# Patient Record
Sex: MALE | Race: WHITE | NOT HISPANIC OR LATINO | Employment: UNEMPLOYED | URBAN - NONMETROPOLITAN AREA
[De-identification: names, ages, dates, MRNs, and addresses within clinical notes are randomized per-mention and may not be internally consistent; named-entity substitution may affect disease eponyms.]

---
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
## 2022-10-26 ENCOUNTER — HOSPITAL ENCOUNTER (EMERGENCY)
Facility: CLINIC | Age: 22
Discharge: HOME | End: 2022-10-26
Attending: PHYSICIAN ASSISTANT | Admitting: PHYSICIAN ASSISTANT
Payer: COMMERCIAL

## 2022-10-26 VITALS
TEMPERATURE: 97.2 F | OXYGEN SATURATION: 100 % | WEIGHT: 158 LBS | BODY MASS INDEX: 23.33 KG/M2 | SYSTOLIC BLOOD PRESSURE: 136 MMHG | RESPIRATION RATE: 16 BRPM | HEART RATE: 63 BPM | DIASTOLIC BLOOD PRESSURE: 64 MMHG

## 2022-10-26 DIAGNOSIS — M54.50 ACUTE LEFT-SIDED LOW BACK PAIN WITHOUT SCIATICA: ICD-10-CM

## 2022-10-26 PROCEDURE — G0463 HOSPITAL OUTPT CLINIC VISIT: HCPCS | Performed by: PHYSICIAN ASSISTANT

## 2022-10-26 PROCEDURE — 99213 OFFICE O/P EST LOW 20 MIN: CPT | Performed by: PHYSICIAN ASSISTANT

## 2022-10-26 RX ORDER — IBUPROFEN 600 MG/1
600 TABLET, FILM COATED ORAL EVERY 6 HOURS PRN
Qty: 28 TABLET | Refills: 0 | Status: SHIPPED | OUTPATIENT
Start: 2022-10-26 | End: 2022-11-02

## 2022-10-26 RX ORDER — LIDOCAINE 50 MG/G
1 PATCH TOPICAL EVERY 24 HOURS
Qty: 6 PATCH | Refills: 0 | Status: SHIPPED | OUTPATIENT
Start: 2022-10-26 | End: 2022-11-01

## 2022-10-26 RX ORDER — METHOCARBAMOL 750 MG/1
750 TABLET, FILM COATED ORAL 4 TIMES DAILY PRN
Qty: 16 TABLET | Refills: 0 | Status: SHIPPED | OUTPATIENT
Start: 2022-10-26 | End: 2022-10-30

## 2022-10-26 ASSESSMENT — ENCOUNTER SYMPTOMS
BACK PAIN: 1
NEUROLOGICAL NEGATIVE: 1
CONSTITUTIONAL NEGATIVE: 1
GASTROINTESTINAL NEGATIVE: 1

## 2022-10-26 NOTE — DISCHARGE INSTRUCTIONS
Recommend urgent medical evaluation if you develop worsening pain, worsening numbness or tingling or loss of feeling in the back or lower extremities, or saddle anesthesia (numbness or tingling in the groin area) with urinary or bowel incontinence or retention.  Apply ice to the affected area and 15 to 20-minute intervals 4 times per day for 48 hours post injury, may then consider applying heat in 15 to 20-minute intervals 4 times per day.  May use Voltaren gel 4 times per day or a lidocaine patches (apply to the low back for 12 hours per day) for additional pain control. Recommend not using heat at the same time as lidocaine patches or Voltaren gel to avoid excessive irritation.    Prescribed robaxin, which is a muscle relaxant.  Muscle relaxants will make you feel drowsy.  No working, driving, or operating equipment for 8 hours from your last dose of muscle relaxants.

## 2022-10-26 NOTE — ED PROVIDER NOTES
History     Chief Complaint   Patient presents with     Back Pain     Pt was lifting a dock at work and hurt his back.     HPI  Ayaz Bocanegra is a 21 year old male a nonconcerning past medical history who presents for evaluation of left-sided back pain which began yesterday.  States that he was lifting a dock at work, typically lifts docks all day, and suddenly developed pain on the left side in the lumbar and thoracic regions.  Pain is exacerbated with rotation at the hips, the pain causes him to feel short of breath.  However, he otherwise denies shortness of breath.  No saddle anesthesia, no urinary or bowel incontinence or retention.  No trauma.  No numbness or tingling or weakness in the extremities.    Allergies:  Allergies   Allergen Reactions     Amoxicillin        Problem List:    Patient Active Problem List    Diagnosis Date Noted     Current moderate episode of major depressive disorder without prior episode (H) 12/03/2020     Priority: Medium     ELIEL (generalized anxiety disorder) 12/03/2020     Priority: Medium     Laceration 01/23/2017     Priority: Medium     Headache 07/26/2013     Priority: Medium     Problem list name updated by automated process. Provider to review       Otitis media      Priority: Medium     recurrent  Problem list name updated by automated process. Provider to review          Past Medical History:    History reviewed. No pertinent past medical history.    Past Surgical History:    History reviewed. No pertinent surgical history.    Family History:    Family History   Problem Relation Age of Onset     Anxiety Disorder Mother      Anxiety Disorder Father        Social History:  Marital Status:  Single [1]  Social History     Tobacco Use     Smoking status: Never     Smokeless tobacco: Never   Substance Use Topics     Alcohol use: Never     Drug use: Never        Medications:    ibuprofen (ADVIL/MOTRIN) 600 MG tablet  lidocaine (LIDODERM) 5 % patch  methocarbamol (ROBAXIN)  750 MG tablet  FLUoxetine (PROZAC) 20 MG capsule          Review of Systems   Constitutional: Negative.    Gastrointestinal: Negative.    Genitourinary: Negative.    Musculoskeletal: Positive for back pain.   Neurological: Negative.        Physical Exam   BP: 136/64  Pulse: 63  Temp: 97.2  F (36.2  C)  Resp: 16  Weight: 71.7 kg (158 lb)  SpO2: 100 %      Physical Exam  Constitutional:       General: He is not in acute distress.     Appearance: Normal appearance. He is not ill-appearing, toxic-appearing or diaphoretic.   Pulmonary:      Effort: Pulmonary effort is normal. No respiratory distress.      Breath sounds: No stridor. No wheezing, rhonchi or rales.   Chest:      Chest wall: No tenderness.   Musculoskeletal:         General: No swelling.      Cervical back: Normal.      Thoracic back: Spasms and tenderness present. No swelling, edema, deformity, signs of trauma, lacerations or bony tenderness. Normal range of motion.      Lumbar back: Spasms and tenderness present. No swelling, edema, deformity, signs of trauma, lacerations or bony tenderness. Normal range of motion. Negative right straight leg raise test and negative left straight leg raise test. No scoliosis.        Back:       Comments: Spasms and tenderness over the paraspinal muscles at the level of T11-L5 on the left side at the location shown on the diagram above.   Skin:     General: Skin is warm and dry.      Findings: No erythema or rash.   Neurological:      General: No focal deficit present.      Mental Status: He is alert and oriented to person, place, and time.      Sensory: No sensory deficit.      Motor: No weakness.      Gait: Gait normal.      Deep Tendon Reflexes: Reflexes normal.         ED Course                 Procedures                  No results found for this or any previous visit (from the past 24 hour(s)).    Medications - No data to display    Assessments & Plan (with Medical Decision Making)     The patient is a 21-year-old  male who presents to clinic for left-sided low back pain brought on by heavy lifting.  He has no symptoms consistent with saddle anesthesia, conus medullaris, or cauda equina syndrome today.  Normal neuromuscular function in the lower extremities.  No concerns with bowel or bladder function.      Recommend urgent medical evaluation if you develop worsening pain, worsening numbness or tingling or loss of feeling in the back or lower extremities, or saddle anesthesia (numbness or tingling in the groin area) with urinary or bowel incontinence or retention.  Apply ice to the affected area and 15 to 20-minute intervals 4 times per day for 48 hours post injury, may then consider applying heat in 15 to 20-minute intervals 4 times per day.  May use Voltaren gel 4 times per day or a lidocaine patches (apply to the low back for 12 hours per day) for additional pain control. Recommend not using heat at the same time as lidocaine patches or Voltaren gel to avoid excessive irritation.    Prescribed robaxin, which is a muscle relaxant.  Muscle relaxants will make you feel drowsy.  No working, driving, or operating equipment for 8 hours from your last dose of muscle relaxants.        I have reviewed the nursing notes.    I have reviewed the findings, diagnosis, plan and need for follow up with the patient.      Discharge Medication List as of 10/26/2022  2:17 PM      START taking these medications    Details   ibuprofen (ADVIL/MOTRIN) 600 MG tablet Take 1 tablet (600 mg) by mouth every 6 hours as needed for moderate pain, Disp-28 tablet, R-0, E-Prescribe      lidocaine (LIDODERM) 5 % patch Place 1 patch onto the skin every 24 hours for 6 days To prevent lidocaine toxicity, patient should be patch free for 12 hrs daily.Disp-6 patch, [id number]      methocarbamol (ROBAXIN) 750 MG tablet Take 1 tablet (750 mg) by mouth 4 times daily as needed for muscle spasms, Disp-16 tablet, R-0, E-Prescribe             Final diagnoses:   Acute  left-sided low back pain without sciatica       10/26/2022   [facility] EMERGENCY DEPT     Jamie Rao PA-C  10/26/22 2111

## 2023-01-03 ENCOUNTER — OFFICE VISIT (OUTPATIENT)
Dept: FAMILY MEDICINE | Facility: CLINIC | Age: 23
End: 2023-01-03
Payer: COMMERCIAL

## 2023-01-03 VITALS
WEIGHT: 166 LBS | BODY MASS INDEX: 24.51 KG/M2 | RESPIRATION RATE: 18 BRPM | DIASTOLIC BLOOD PRESSURE: 80 MMHG | HEART RATE: 104 BPM | SYSTOLIC BLOOD PRESSURE: 134 MMHG | TEMPERATURE: 98.3 F

## 2023-01-03 DIAGNOSIS — J02.0 STREPTOCOCCAL PHARYNGITIS: Primary | ICD-10-CM

## 2023-01-03 DIAGNOSIS — J02.9 SORE THROAT: ICD-10-CM

## 2023-01-03 LAB — DEPRECATED S PYO AG THROAT QL EIA: POSITIVE

## 2023-01-03 PROCEDURE — 99213 OFFICE O/P EST LOW 20 MIN: CPT | Performed by: NURSE PRACTITIONER

## 2023-01-03 PROCEDURE — 87880 STREP A ASSAY W/OPTIC: CPT | Performed by: NURSE PRACTITIONER

## 2023-01-03 RX ORDER — AZITHROMYCIN 250 MG/1
TABLET, FILM COATED ORAL
Qty: 6 TABLET | Refills: 0 | Status: SHIPPED | OUTPATIENT
Start: 2023-01-03 | End: 2023-01-08

## 2023-01-03 ASSESSMENT — PATIENT HEALTH QUESTIONNAIRE - PHQ9
10. IF YOU CHECKED OFF ANY PROBLEMS, HOW DIFFICULT HAVE THESE PROBLEMS MADE IT FOR YOU TO DO YOUR WORK, TAKE CARE OF THINGS AT HOME, OR GET ALONG WITH OTHER PEOPLE: NOT DIFFICULT AT ALL
SUM OF ALL RESPONSES TO PHQ QUESTIONS 1-9: 0
SUM OF ALL RESPONSES TO PHQ QUESTIONS 1-9: 0

## 2023-01-03 NOTE — PROGRESS NOTES
"  Assessment & Plan     Streptococcal pharyngitis  Allergy to PCN  Begin  - azithromycin (ZITHROMAX) 250 MG tablet  Dispense: 6 tablet; Refill: 0    Sore throat  Symptomatic care strategies reviewed.   - Streptococcus A Rapid Screen w/Reflex to PCR - Clinic Collect      Call or return to the clinic with any worsening of symptoms or no resolution. Patient/Parent verbalized understanding and is in agreement. Medication side effects reviewed.   Current Outpatient Medications   Medication Sig Dispense Refill     azithromycin (ZITHROMAX) 250 MG tablet Take 2 tablets (500 mg) by mouth daily for 1 day, THEN 1 tablet (250 mg) daily for 4 days. 6 tablet 0     FLUoxetine (PROZAC) 20 MG capsule Take 1 capsule (20 mg) by mouth daily 90 capsule 3     Chart documentation with Dragon Voice recognition Software. Although reviewed after completion, some words and grammatical errors may remain.    JAYESH Loving CNP  [facility]    Sanjana Jacome is a 22 year old, presenting for the following health issues:  Pharyngitis      History of Present Illness       Headaches:   Since the patient's last clinic visit, headaches are: no change  The patient is getting headaches:  Not to often  He is able to do normal daily activities when he has a migraine.  The patient is taking the following rescue/relief medications:  Ibuprofen (Advil, Motrin) and Tylenol   Patient states \"I get some relief\" from the rescue/relief medications.   The patient is taking the following medications to prevent migraines:  No medications to prevent migraines  In the past 4 weeks, the patient has gone to an Urgent Care or Emergency Room 0 times times due to headaches.    Reason for visit:  Test for strep  Symptom onset:  1-3 days ago  Symptoms include:  Headache, soar throat wake up sweating hot and cold  Symptom intensity:  Moderate  Symptom progression:  Staying the same  Had these symptoms before:  No  What makes it " worse:  No    He eats 0-1 servings of fruits and vegetables daily.He consumes 1 sweetened beverage(s) daily.He exercises with enough effort to increase his heart rate 20 to 29 minutes per day.  He exercises with enough effort to increase his heart rate 7 days per week.   He is taking medications regularly.    Today's PHQ-9         PHQ-9 Total Score: 0    PHQ-9 Q9 Thoughts of better off dead/self-harm past 2 weeks :   Not at all    How difficult have these problems made it for you to do your work, take care of things at home, or get along with other people: Not difficult at all     Negative Covid this morning           Review of Systems   Constitutional, HEENT, cardiovascular, pulmonary, GI, , musculoskeletal, neuro, skin, endocrine and psych systems are negative, except as otherwise noted.      Objective    /80   Pulse 104   Temp 98.3  F (36.8  C) (Tympanic)   Resp 18   Wt 75.3 kg (166 lb)   BMI 24.51 kg/m    Body mass index is 24.51 kg/m .  Physical Exam   GENERAL: healthy, alert and no distress  EYES: Eyes grossly normal to inspection, PERRL and conjunctivae and sclerae normal  HENT: ear canals and TM's normal, nose and mouth without ulcers or lesions tonsillar hypertrophy, tonsillar erythema and tonsillar exudate  NECK: no adenopathy, no asymmetry, masses, or scars and thyroid normal to palpation  RESP: lungs clear to auscultation - no rales, rhonchi or wheezes  CV: regular rate and rhythm, normal S1 S2, no S3 or S4, no murmur, click or rub, no peripheral edema and peripheral pulses strong  ABDOMEN: soft, nontender, no hepatosplenomegaly, no masses and bowel sounds normal  MS: no gross musculoskeletal defects noted, no edema  SKIN: no suspicious lesions or rashes  NEURO: Normal strength and tone, mentation intact and speech normal  PSYCH: mentation appears normal, affect normal/bright    Results for orders placed or performed in visit on 01/03/23   Streptococcus A Rapid Screen w/Reflex to PCR -  Clinic Collect     Status: Abnormal    Specimen: Throat; Swab   Result Value Ref Range    Group A Strep antigen Positive (A) Negative

## 2024-09-25 ENCOUNTER — OFFICE VISIT (OUTPATIENT)
Dept: FAMILY MEDICINE | Facility: CLINIC | Age: 24
End: 2024-09-25
Payer: COMMERCIAL

## 2024-09-25 VITALS
DIASTOLIC BLOOD PRESSURE: 78 MMHG | SYSTOLIC BLOOD PRESSURE: 130 MMHG | BODY MASS INDEX: 25.25 KG/M2 | TEMPERATURE: 98.4 F | HEIGHT: 70 IN | WEIGHT: 176.4 LBS | OXYGEN SATURATION: 98 % | RESPIRATION RATE: 16 BRPM | HEART RATE: 79 BPM

## 2024-09-25 DIAGNOSIS — Z11.3 ROUTINE SCREENING FOR STI (SEXUALLY TRANSMITTED INFECTION): ICD-10-CM

## 2024-09-25 DIAGNOSIS — L65.9 HAIR LOSS: ICD-10-CM

## 2024-09-25 DIAGNOSIS — Z83.49 FAMILY HISTORY OF THYROID DISEASE: ICD-10-CM

## 2024-09-25 DIAGNOSIS — K52.9 CHRONIC DIARRHEA: ICD-10-CM

## 2024-09-25 PROBLEM — F32.1 CURRENT MODERATE EPISODE OF MAJOR DEPRESSIVE DISORDER WITHOUT PRIOR EPISODE (H): Status: RESOLVED | Noted: 2020-12-03 | Resolved: 2024-09-25

## 2024-09-25 LAB
ALBUMIN SERPL BCG-MCNC: 4.8 G/DL (ref 3.5–5.2)
ALP SERPL-CCNC: 87 U/L (ref 40–150)
ALT SERPL W P-5'-P-CCNC: 36 U/L (ref 0–70)
ANION GAP SERPL CALCULATED.3IONS-SCNC: 12 MMOL/L (ref 7–15)
AST SERPL W P-5'-P-CCNC: 26 U/L (ref 0–45)
BASOPHILS # BLD AUTO: 0 10E3/UL (ref 0–0.2)
BASOPHILS NFR BLD AUTO: 0 %
BILIRUB SERPL-MCNC: 0.7 MG/DL
BUN SERPL-MCNC: 15.7 MG/DL (ref 6–20)
CALCIUM SERPL-MCNC: 10.2 MG/DL (ref 8.8–10.4)
CHLORIDE SERPL-SCNC: 97 MMOL/L (ref 98–107)
CREAT SERPL-MCNC: 1.22 MG/DL (ref 0.67–1.17)
CRP SERPL-MCNC: 4.48 MG/L
EGFRCR SERPLBLD CKD-EPI 2021: 87 ML/MIN/1.73M2
EOSINOPHIL # BLD AUTO: 0.1 10E3/UL (ref 0–0.7)
EOSINOPHIL NFR BLD AUTO: 1 %
ERYTHROCYTE [DISTWIDTH] IN BLOOD BY AUTOMATED COUNT: 11.3 % (ref 10–15)
ERYTHROCYTE [SEDIMENTATION RATE] IN BLOOD BY WESTERGREN METHOD: 4 MM/HR (ref 0–15)
GLUCOSE SERPL-MCNC: 127 MG/DL (ref 70–99)
HCO3 SERPL-SCNC: 29 MMOL/L (ref 22–29)
HCT VFR BLD AUTO: 48.2 % (ref 40–53)
HGB BLD-MCNC: 16.2 G/DL (ref 13.3–17.7)
IMM GRANULOCYTES # BLD: 0 10E3/UL
IMM GRANULOCYTES NFR BLD: 0 %
LIPASE SERPL-CCNC: 30 U/L (ref 13–60)
LYMPHOCYTES # BLD AUTO: 2.4 10E3/UL (ref 0.8–5.3)
LYMPHOCYTES NFR BLD AUTO: 28 %
MCH RBC QN AUTO: 28.6 PG (ref 26.5–33)
MCHC RBC AUTO-ENTMCNC: 33.6 G/DL (ref 31.5–36.5)
MCV RBC AUTO: 85 FL (ref 78–100)
MONOCYTES # BLD AUTO: 0.6 10E3/UL (ref 0–1.3)
MONOCYTES NFR BLD AUTO: 7 %
NEUTROPHILS # BLD AUTO: 5.4 10E3/UL (ref 1.6–8.3)
NEUTROPHILS NFR BLD AUTO: 64 %
PLATELET # BLD AUTO: 223 10E3/UL (ref 150–450)
POTASSIUM SERPL-SCNC: 3.4 MMOL/L (ref 3.4–5.3)
PROT SERPL-MCNC: 7.7 G/DL (ref 6.4–8.3)
RBC # BLD AUTO: 5.66 10E6/UL (ref 4.4–5.9)
SODIUM SERPL-SCNC: 138 MMOL/L (ref 135–145)
TSH SERPL DL<=0.005 MIU/L-ACNC: 1.2 UIU/ML (ref 0.3–4.2)
WBC # BLD AUTO: 8.4 10E3/UL (ref 4–11)

## 2024-09-25 PROCEDURE — 86364 TISS TRNSGLTMNASE EA IG CLAS: CPT | Performed by: FAMILY MEDICINE

## 2024-09-25 PROCEDURE — 85025 COMPLETE CBC W/AUTO DIFF WBC: CPT | Performed by: FAMILY MEDICINE

## 2024-09-25 PROCEDURE — 86780 TREPONEMA PALLIDUM: CPT | Performed by: FAMILY MEDICINE

## 2024-09-25 PROCEDURE — 84443 ASSAY THYROID STIM HORMONE: CPT | Performed by: FAMILY MEDICINE

## 2024-09-25 PROCEDURE — 80053 COMPREHEN METABOLIC PANEL: CPT | Performed by: FAMILY MEDICINE

## 2024-09-25 PROCEDURE — 86140 C-REACTIVE PROTEIN: CPT | Performed by: FAMILY MEDICINE

## 2024-09-25 PROCEDURE — 87591 N.GONORRHOEAE DNA AMP PROB: CPT | Performed by: FAMILY MEDICINE

## 2024-09-25 PROCEDURE — 85652 RBC SED RATE AUTOMATED: CPT | Performed by: FAMILY MEDICINE

## 2024-09-25 PROCEDURE — 87491 CHLMYD TRACH DNA AMP PROBE: CPT | Performed by: FAMILY MEDICINE

## 2024-09-25 PROCEDURE — 83690 ASSAY OF LIPASE: CPT | Performed by: FAMILY MEDICINE

## 2024-09-25 PROCEDURE — 36415 COLL VENOUS BLD VENIPUNCTURE: CPT | Performed by: FAMILY MEDICINE

## 2024-09-25 PROCEDURE — 99214 OFFICE O/P EST MOD 30 MIN: CPT | Performed by: FAMILY MEDICINE

## 2024-09-25 PROCEDURE — 87389 HIV-1 AG W/HIV-1&-2 AB AG IA: CPT | Performed by: FAMILY MEDICINE

## 2024-09-25 PROCEDURE — 86803 HEPATITIS C AB TEST: CPT | Performed by: FAMILY MEDICINE

## 2024-09-25 ASSESSMENT — PATIENT HEALTH QUESTIONNAIRE - PHQ9
SUM OF ALL RESPONSES TO PHQ QUESTIONS 1-9: 0
SUM OF ALL RESPONSES TO PHQ QUESTIONS 1-9: 0
10. IF YOU CHECKED OFF ANY PROBLEMS, HOW DIFFICULT HAVE THESE PROBLEMS MADE IT FOR YOU TO DO YOUR WORK, TAKE CARE OF THINGS AT HOME, OR GET ALONG WITH OTHER PEOPLE: NOT DIFFICULT AT ALL

## 2024-09-25 ASSESSMENT — ENCOUNTER SYMPTOMS: DIARRHEA: 1

## 2024-09-25 ASSESSMENT — PAIN SCALES - GENERAL: PAINLEVEL: NO PAIN (0)

## 2024-09-25 NOTE — PATIENT INSTRUCTIONS
You will be contacted in 1-3 days for results of your lab tests.     Further recommendations thereafter.    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    Observe for recurrence of diarrhea.  Contact care team if it recurs.  Possible colonoscopy then.

## 2024-09-25 NOTE — PROGRESS NOTES
Assessment & Plan     Chronic diarrhea  Present until about oa week ago. May still recur.  Try to rule out various etiologies including but not limtied to IBS, IBD, celiac disease, hyperthyroidism.  Less suspect for lactose intolerance since his diarrhea actually stopped after he did lactose loading 2 weeks ago.  Doubt infectious due to duration with no systemic signs.  Consider colonsocopy if diarrhea recurs.  Return precautions discussed and given to patient.   - CBC with Platelets & Differential  - Comprehensive metabolic panel  - HIV Antigen Antibody Combo Cascade  - Lipase  - CRP inflammation  - Erythrocyte sedimentation rate auto  - Tissue transglutaminase trisha IgA and IgG  - CBC with Platelets & Differential  - Comprehensive metabolic panel  - HIV Antigen Antibody Combo Cascade  - Lipase  - CRP inflammation  - Erythrocyte sedimentation rate auto  - Tissue transglutaminase trisha IgA and IgG    Hair loss  Consistent with male-pattern loss.  However, will try to rule out remote possibility of occult diseases per patient request. This is reasonable as thyroid deficiency, other deficiencies or even inflammatory conditions  may cause hair loss.  May consider minoxidil or other hair loss treatment.  Consider derm consult as well.  - CRP inflammation  - Erythrocyte sedimentation rate auto  - TSH with free T4 reflex  - CRP inflammation  - Erythrocyte sedimentation rate auto  - TSH with free T4 reflex    Family history of thyroid disease  - TSH with free T4 reflex  - TSH with free T4 reflex    Routine screening for STI (sexually transmitted infection)  - HIV Antigen Antibody Combo Cascade  - Hepatitis C Screen Reflex to HCV RNA Quant and Genotype  - Treponema Abs w Reflex to RPR and Titer  - Chlamydia trachomatis/Neisseria gonorrhoeae by PCR  - HIV Antigen Antibody Combo Cascade  - Hepatitis C Screen Reflex to HCV RNA Quant and Genotype  - Treponema Abs w Reflex to RPR and Titer  - Chlamydia trachomatis/Neisseria  gonorrhoeae by PCR      Patient Instructions   You will be contacted in 1-3 days for results of your lab tests.     Further recommendations thereafter.    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    Observe for recurrence of diarrhea.  Contact care team if it recurs.  Possible colonoscopy then.    Sanjana Jacome is a 23 year old, presenting for the following health issues:  Thyroid Problem (Would like to be checked for thyroid disease, does run in the family, is having hair loss) and Diarrhea (Having some issues with loose stools. )        9/25/2024     4:11 PM   Additional Questions   Roomed by Dayana KISER   Accompanied by self     Diarrhea    History of Present Illness       Reason for visit:  Check up   He is taking medications regularly.       Chief Complaint   Patient presents with    Thyroid Problem     Would like to be checked for thyroid disease, does run in the family, is having hair loss    Diarrhea     Having some issues with loose stools.        Hair loss    Duration: first noticed about a year ago  Description (location/character/radiation): frontal hairline started receding, hair density has decreased as well. Brushing hair would cause few hair strands to come off, no clumps of hair falling off. Denies scalp symptoms  Intensity:  moderate  Accompanying signs and symptoms: none  History (similar episodes/previous evaluation): None; has not used any new hair products   Patient denies wearing tight headgear; patient denies tying hair tightly  Precipitating or alleviating factors: None  Therapies tried and outcome: None  Father and maternal grandfather have thinned hair (male pattern)  Patient reports family hx of thyroid disease but patient is not aware of type.      Diarrhea  Onset/Duration: would occur occasionally for the past 2 years, getting more frequent, worse in the am  Description:       Consistency of stool: watery and loose       Blood in stool: No       Number of loose stools past 24 hours:  "0  Patient said in last several motnhs, the loose stools have been every morning almost. Tried to eat more dairy 2 weeks ago - no increase in symptoms and the diarrhea actually stopped since last week.  Progression of Symptoms: worsening  Accompanying signs and symptoms:       Fever: No       Nausea/Vomiting: No       Abdominal pain: YES - cramping before BM; resolves after BM       Weight loss: No       Episodes of constipation: No  Deneis excessive weight gain.  History   Ill contacts: N/A  Recent use of antibiotics: No  Recent travels: No  Recent medication-new or changes(Rx or OTC): No  Precipitating or alleviating factors: None    Therapies tried and outcome: none      STD screening:  - admits to having had sexual partners   - admits to previous use of illicit substances but denies IV drug use.  - denies other risky behavior for STD infection.      Review of Systems  Constitutional, HEENT, cardiovascular, pulmonary, GI, , musculoskeletal, neuro, skin, endocrine and psych systems are negative, except as otherwise noted.      Objective    /78 (BP Location: Right arm, Patient Position: Chair, Cuff Size: Adult Large)   Pulse 79   Temp 98.4  F (36.9  C) (Tympanic)   Resp 16   Ht 1.765 m (5' 9.5\")   Wt 80 kg (176 lb 6.4 oz)   SpO2 98%   BMI 25.68 kg/m    Body mass index is 25.68 kg/m .  Physical Exam   GENERAL: well-nourished , alert and no distress  EYES: no icterus  NECK: no palpable mass, no thyromegaly  RESP: lungs clear to auscultation - no rales, no rhonchi, no wheezes  CV: regular rates and rhythm, normal S1 S2, no S3 or S4 and no murmur, no click or rub  ABD: flat abdomen, no skin changes, no TTP, no palpable mass, no guarding, no Cottrell's sign, no palpablemegaly  MS: extremities- no gross deformities noted, no edema  SKIN: good turgor, no jaundice or rash; receded bitemporal hairlines with lower density of hair on the frontoparietal area symmetrically consistent with male pattern loss; no " areas of alopecia    Results for orders placed or performed in visit on 09/25/24   CBC with platelets and differential     Status: None   Result Value Ref Range    WBC Count 8.4 4.0 - 11.0 10e3/uL    RBC Count 5.66 4.40 - 5.90 10e6/uL    Hemoglobin 16.2 13.3 - 17.7 g/dL    Hematocrit 48.2 40.0 - 53.0 %    MCV 85 78 - 100 fL    MCH 28.6 26.5 - 33.0 pg    MCHC 33.6 31.5 - 36.5 g/dL    RDW 11.3 10.0 - 15.0 %    Platelet Count 223 150 - 450 10e3/uL    % Neutrophils 64 %    % Lymphocytes 28 %    % Monocytes 7 %    % Eosinophils 1 %    % Basophils 0 %    % Immature Granulocytes 0 %    Absolute Neutrophils 5.4 1.6 - 8.3 10e3/uL    Absolute Lymphocytes 2.4 0.8 - 5.3 10e3/uL    Absolute Monocytes 0.6 0.0 - 1.3 10e3/uL    Absolute Eosinophils 0.1 0.0 - 0.7 10e3/uL    Absolute Basophils 0.0 0.0 - 0.2 10e3/uL    Absolute Immature Granulocytes 0.0 <=0.4 10e3/uL   CBC with Platelets & Differential     Status: None    Narrative    The following orders were created for panel order CBC with Platelets & Differential.  Procedure                               Abnormality         Status                     ---------                               -----------         ------                     CBC with platelets and d...[[id number]]                      Final result                 Please view results for these tests on the individual orders.           Signed Electronically by: Rom Robin MD

## 2024-09-26 LAB
C TRACH DNA SPEC QL PROBE+SIG AMP: NEGATIVE
HCV AB SERPL QL IA: NONREACTIVE
HIV 1+2 AB+HIV1 P24 AG SERPL QL IA: NONREACTIVE
N GONORRHOEA DNA SPEC QL NAA+PROBE: NEGATIVE
T PALLIDUM AB SER QL: NONREACTIVE
TTG IGA SER-ACNC: 0.2 U/ML
TTG IGG SER-ACNC: <0.6 U/ML